# Patient Record
Sex: FEMALE | URBAN - METROPOLITAN AREA
[De-identification: names, ages, dates, MRNs, and addresses within clinical notes are randomized per-mention and may not be internally consistent; named-entity substitution may affect disease eponyms.]

---

## 2017-08-25 ENCOUNTER — APPOINTMENT (RX ONLY)
Dept: URBAN - METROPOLITAN AREA CLINIC 24 | Facility: CLINIC | Age: 27
Setting detail: DERMATOLOGY
End: 2017-08-25

## 2017-08-25 DIAGNOSIS — L50.8 OTHER URTICARIA: ICD-10-CM

## 2017-08-25 DIAGNOSIS — Z87.891 PERSONAL HISTORY OF NICOTINE DEPENDENCE: ICD-10-CM

## 2017-08-25 DIAGNOSIS — Z79.899 OTHER LONG TERM (CURRENT) DRUG THERAPY: ICD-10-CM

## 2017-08-25 PROBLEM — L85.3 XEROSIS CUTIS: Status: ACTIVE | Noted: 2017-08-25

## 2017-08-25 PROBLEM — L20.84 INTRINSIC (ALLERGIC) ECZEMA: Status: ACTIVE | Noted: 2017-08-25

## 2017-08-25 PROBLEM — F41.9 ANXIETY DISORDER, UNSPECIFIED: Status: ACTIVE | Noted: 2017-08-25

## 2017-08-25 PROBLEM — L29.8 OTHER PRURITUS: Status: ACTIVE | Noted: 2017-08-25

## 2017-08-25 PROCEDURE — ? TREATMENT REGIMEN

## 2017-08-25 PROCEDURE — ? PRESCRIPTION

## 2017-08-25 PROCEDURE — ? HIGH RISK MEDICATION MONITORING

## 2017-08-25 PROCEDURE — ? COUNSELING

## 2017-08-25 PROCEDURE — 99203 OFFICE O/P NEW LOW 30 MIN: CPT

## 2017-08-25 RX ORDER — TRIAMCINOLONE ACETONIDE 1 MG/G
THIN FILM CREAM TOPICAL BID
Qty: 1 | Refills: 2 | Status: ERX | COMMUNITY
Start: 2017-08-25

## 2017-08-25 RX ORDER — MONTELUKAST SODIUM 10 MG/1
1 TABLET, FILM COATED ORAL QHS
Qty: 30 | Refills: 6 | Status: ERX | COMMUNITY
Start: 2017-08-25

## 2017-08-25 RX ORDER — PREDNISONE 20 MG/1
AS DIRECTED TABLET ORAL QD
Qty: 30 | Refills: 0 | Status: ERX | COMMUNITY
Start: 2017-08-25

## 2017-08-25 RX ORDER — CETIRIZINE HCL/PSEUDOEPHEDRINE 5 MG-120MG
1 TABLET, EXTENDED RELEASE 12 HR ORAL QD
Qty: 30 | Refills: 6 | Status: ERX

## 2017-08-25 RX ORDER — SALICYLIC ACID 3 G/100G
1 LOTION TOPICAL QD
Qty: 30 | Refills: 6 | Status: ERX | COMMUNITY
Start: 2017-08-25

## 2017-08-25 RX ORDER — LORATADINE 10 MG/1
1 TABLET ORAL QD
Qty: 30 | Refills: 6 | Status: ERX | COMMUNITY
Start: 2017-08-25

## 2017-08-25 RX ORDER — HYDROXYZINE HYDROCHLORIDE 25 MG/1
1 TABLET, FILM COATED ORAL QHS
Qty: 60 | Refills: 6 | Status: ERX | COMMUNITY
Start: 2017-08-25

## 2017-08-25 RX ADMIN — HYDROXYZINE HYDROCHLORIDE 1: 25 TABLET, FILM COATED ORAL at 00:00

## 2017-08-25 RX ADMIN — SALICYLIC ACID 1: 3 LOTION TOPICAL at 00:00

## 2017-08-25 RX ADMIN — TRIAMCINOLONE ACETONIDE THIN FILM: 1 CREAM TOPICAL at 00:00

## 2017-08-25 RX ADMIN — MONTELUKAST SODIUM 1: 10 TABLET, FILM COATED ORAL at 00:00

## 2017-08-25 RX ADMIN — LORATADINE 1: 10 TABLET ORAL at 00:00

## 2017-08-25 RX ADMIN — PREDNISONE AS DIRECTED: 20 TABLET ORAL at 00:00

## 2017-08-25 ASSESSMENT — LOCATION SIMPLE DESCRIPTION DERM
LOCATION SIMPLE: RIGHT FOREARM
LOCATION SIMPLE: RIGHT HAND
LOCATION SIMPLE: LEFT HAND
LOCATION SIMPLE: RIGHT FOREHEAD

## 2017-08-25 ASSESSMENT — LOCATION DETAILED DESCRIPTION DERM
LOCATION DETAILED: LEFT ULNAR DORSAL HAND
LOCATION DETAILED: RIGHT VENTRAL DISTAL FOREARM
LOCATION DETAILED: RIGHT RADIAL DORSAL HAND
LOCATION DETAILED: RIGHT FOREHEAD

## 2017-08-25 ASSESSMENT — LOCATION ZONE DERM
LOCATION ZONE: FACE
LOCATION ZONE: ARM
LOCATION ZONE: HAND

## 2017-08-25 NOTE — PROCEDURE: TREATMENT REGIMEN
Initiate Treatment: -Triamcinolone 0.1% cream BID x 15 days max / month\\n-Allegra 180mg QAM \\n-Claritin 10mg QAM \\n-Prednisone 40mg x 10 days , 20mg x 10 days \\n-Singulair 10mg QHS \\n-Zyrtec 10mg QHS \\n-Hydroxyzine 25mg 1-2 pills q 4-6 hrs as needed for itching
Plan: -Discussed gentle skin care \\n-Avoid fragrance products\\n-no known identifiable triggers\\n-her medications have not changed; none are highly suspect to cause urticaria\\n-recommend patient see allergist; patient states she is planning on going to [facility] allergy department as soon as she can get an appointment\\n-discussed immumosuppressive medications as last resort if a cause can not be identified\\n-Patient will f/u with Dr Dowell in 1 month
Detail Level: Simple

## 2017-08-25 NOTE — HPI: HIVES (URTICARIA)
How Severe Are Your Hives?: mild
Please Select The Phrase That Best Describes Your Hives.: individual welts stay in the same place for more than 24 hours
Is This A New Presentation, Or A Follow-Up?: Hives

## 2017-08-25 NOTE — PROCEDURE: COUNSELING
Quality 226: Preventive Care And Screening: Tobacco Use: Screening And Cessation Intervention: Patient screened for tobacco and is an ex-smoker
Detail Level: Generalized
Detail Level: Zone

## 2017-08-30 ENCOUNTER — APPOINTMENT (RX ONLY)
Dept: URBAN - METROPOLITAN AREA CLINIC 24 | Facility: CLINIC | Age: 27
Setting detail: DERMATOLOGY
End: 2017-08-30

## 2017-08-30 DIAGNOSIS — Z79.899 OTHER LONG TERM (CURRENT) DRUG THERAPY: ICD-10-CM

## 2017-08-30 DIAGNOSIS — L50.1 IDIOPATHIC URTICARIA: ICD-10-CM

## 2017-08-30 DIAGNOSIS — L50.3 DERMATOGRAPHIC URTICARIA: ICD-10-CM

## 2017-08-30 DIAGNOSIS — L50.8 OTHER URTICARIA: ICD-10-CM

## 2017-08-30 PROBLEM — L30.9 DERMATITIS, UNSPECIFIED: Status: ACTIVE | Noted: 2017-08-30

## 2017-08-30 PROCEDURE — ? COUNSELING

## 2017-08-30 PROCEDURE — 99213 OFFICE O/P EST LOW 20 MIN: CPT | Mod: 25

## 2017-08-30 PROCEDURE — ? TREATMENT REGIMEN

## 2017-08-30 PROCEDURE — ? HIGH RISK MEDICATION MONITORING

## 2017-08-30 PROCEDURE — ? BIOPSY BY PUNCH METHOD

## 2017-08-30 PROCEDURE — 11100: CPT

## 2017-08-30 ASSESSMENT — LOCATION SIMPLE DESCRIPTION DERM
LOCATION SIMPLE: LEFT LIP
LOCATION SIMPLE: LEFT FOREARM
LOCATION SIMPLE: LEFT THIGH
LOCATION SIMPLE: RIGHT FOREARM
LOCATION SIMPLE: UPPER BACK

## 2017-08-30 ASSESSMENT — LOCATION ZONE DERM
LOCATION ZONE: ARM
LOCATION ZONE: LEG
LOCATION ZONE: TRUNK
LOCATION ZONE: LIP

## 2017-08-30 ASSESSMENT — LOCATION DETAILED DESCRIPTION DERM
LOCATION DETAILED: LEFT LOWER CUTANEOUS LIP
LOCATION DETAILED: SUPERIOR THORACIC SPINE
LOCATION DETAILED: LEFT DISTAL RADIAL DORSAL FOREARM
LOCATION DETAILED: LEFT ANTERIOR PROXIMAL THIGH
LOCATION DETAILED: RIGHT VENTRAL DISTAL FOREARM

## 2017-08-30 NOTE — PROCEDURE: TREATMENT REGIMEN
Plan: Disc various triggers for chronic  urticaria \\nPt has h/o sinusitis which can play a role as well\\nFor now ok to stop antihistamines since pt overwhelmed with no. of meds she is on\\nCont prednisone for now\\nBx done to r/o urticarial vasculitis since c/o  pain in a few lesions \\n\\nRTC 10-12 days SR
Continue Regimen: Prednisone
Discontinue Regimen: Zyrtec \\nHydroxyzine \\nClaritin \\nAllegra\\nSingulair
Detail Level: Zone

## 2017-08-30 NOTE — PROCEDURE: BIOPSY BY PUNCH METHOD
Biopsy Type: H and E
Consent: Written consent was obtained and risks were reviewed including but not limited to scarring, infection, bleeding, scabbing, incomplete removal, nerve damage and allergy to anesthesia.
Bill [id number] For Specimen Handling/Conveyance To Laboratory?: no
Post-Care Instructions: I reviewed with the patient in detail post-care instructions. Patient is to keep the biopsy site dry overnight, and then apply bacitracin twice daily until healed. Patient may apply hydrogen peroxide soaks to remove any crusting.
Epidermal Sutures: 4-0 Ethilon
Render Post-Care Instructions In Note?: yes
X Size Of Lesion In Cm (Optional): 0
Hemostasis: None
Anesthesia Volume In Cc: 1
Home Suture Removal Text: Patient was provided a home suture removal kit and will remove their sutures at home.  If they have any questions or difficulties they will call the office.
Lab: [id number]
Detail Level: Detailed
Billing Type: Third-Party Bill
Body Location Override (Optional - Billing Will Still Be Based On Selected Body Map Location If Applicable): left forearm
Wound Care: Vaseline
Suture Removal: 14 days
Anesthesia Type: 1% lidocaine with epinephrine
Punch Size In Mm: 4
Lab Facility: [id number]
Path Notes (To The Dermatopathologist): 8 months h/o painful urticarial lesions
Notification Instructions: Patient will be notified of biopsy results. However, patient instructed to call the office if not contacted within 2 weeks.
Dressing: pressure dressing with telfa

## 2017-09-12 ENCOUNTER — APPOINTMENT (RX ONLY)
Dept: URBAN - METROPOLITAN AREA CLINIC 24 | Facility: CLINIC | Age: 27
Setting detail: DERMATOLOGY
End: 2017-09-12

## 2017-09-12 DIAGNOSIS — L30.9 DERMATITIS, UNSPECIFIED: ICD-10-CM | Status: RESOLVED

## 2017-09-12 DIAGNOSIS — L50.8 OTHER URTICARIA: ICD-10-CM | Status: STABLE

## 2017-09-12 PROCEDURE — ? TREATMENT REGIMEN

## 2017-09-12 PROCEDURE — 99212 OFFICE O/P EST SF 10 MIN: CPT

## 2017-09-12 PROCEDURE — ? SUTURE REMOVAL (GLOBAL PERIOD)

## 2017-09-12 PROCEDURE — ? COUNSELING

## 2017-09-12 ASSESSMENT — LOCATION SIMPLE DESCRIPTION DERM
LOCATION SIMPLE: CHEST
LOCATION SIMPLE: RIGHT FOREARM
LOCATION SIMPLE: LEFT FOREARM
LOCATION SIMPLE: LEFT FOREARM

## 2017-09-12 ASSESSMENT — LOCATION ZONE DERM
LOCATION ZONE: TRUNK
LOCATION ZONE: ARM
LOCATION ZONE: ARM

## 2017-09-12 ASSESSMENT — LOCATION DETAILED DESCRIPTION DERM
LOCATION DETAILED: MIDDLE STERNUM
LOCATION DETAILED: RIGHT PROXIMAL DORSAL FOREARM
LOCATION DETAILED: LEFT PROXIMAL DORSAL FOREARM
LOCATION DETAILED: LEFT PROXIMAL DORSAL FOREARM

## 2017-09-12 NOTE — PROCEDURE: SUTURE REMOVAL (GLOBAL PERIOD)
Add 79038 Cpt? (Important Note: In 2017 The Use Of 11319 Is Being Tracked By Cms To Determine Future Global Period Reimbursement For Global Periods): no
Body Location Override (Optional - Billing Will Still Be Based On Selected Body Map Location If Applicable): left forearm
Detail Level: Detailed

## 2017-09-12 NOTE — PROCEDURE: TREATMENT REGIMEN
Plan: Disc Bx results with pt\\nNow ongoing lesions for a year , unaware of clear triggers\\nResponsive to prednisone, though states still got lesions on it\\nDenies any today\\nDisc various triggers \\nOk to take claritin 10 mg q day and continue Zyrtec 10 mg every night\\nPt does take a lot of OtC vitamins, disc trace metals, dyes that can cause urticaria as well \\n\\nRTC 1 month to see if she still gets lesions on antihistamine regimen
Detail Level: Zone
Otc Regimen: Zyrtec every night for 30 days \\nClaritin every day for 30 days